# Patient Record
Sex: FEMALE | Race: WHITE | NOT HISPANIC OR LATINO
[De-identification: names, ages, dates, MRNs, and addresses within clinical notes are randomized per-mention and may not be internally consistent; named-entity substitution may affect disease eponyms.]

---

## 2018-12-01 ENCOUNTER — RX ONLY (OUTPATIENT)
Age: 43
Setting detail: RX ONLY
End: 2018-12-01

## 2019-10-30 ENCOUNTER — HOSPITAL ENCOUNTER (OUTPATIENT)
Dept: HOSPITAL 61 - SURGPAT | Age: 44
Discharge: HOME | End: 2019-10-30
Attending: OBSTETRICS & GYNECOLOGY
Payer: OTHER GOVERNMENT

## 2019-10-30 DIAGNOSIS — M25.78: ICD-10-CM

## 2019-10-30 DIAGNOSIS — E03.9: ICD-10-CM

## 2019-10-30 DIAGNOSIS — M47.814: ICD-10-CM

## 2019-10-30 DIAGNOSIS — Z01.818: Primary | ICD-10-CM

## 2019-10-30 DIAGNOSIS — I10: ICD-10-CM

## 2019-10-30 LAB
ALBUMIN SERPL-MCNC: 3.9 G/DL (ref 3.4–5)
ALBUMIN/GLOB SERPL: 1.1 {RATIO} (ref 1–1.7)
ALP SERPL-CCNC: 53 U/L (ref 46–116)
ALT SERPL-CCNC: 15 U/L (ref 14–59)
AMORPH SED URNS QL MICRO: PRESENT /HPF
ANION GAP SERPL CALC-SCNC: 10 MMOL/L (ref 6–14)
APTT PPP: YELLOW S
AST SERPL-CCNC: 14 U/L (ref 15–37)
BACTERIA #/AREA URNS HPF: 0 /HPF
BASOPHILS # BLD AUTO: 0.1 X10^3/UL (ref 0–0.2)
BASOPHILS NFR BLD: 1 % (ref 0–3)
BILIRUB SERPL-MCNC: 0.2 MG/DL (ref 0.2–1)
BILIRUB UR QL STRIP: NEGATIVE
BUN SERPL-MCNC: 13 MG/DL (ref 7–20)
BUN/CREAT SERPL: 13 (ref 6–20)
CALCIUM SERPL-MCNC: 8.8 MG/DL (ref 8.5–10.1)
CHLORIDE SERPL-SCNC: 107 MMOL/L (ref 98–107)
CO2 SERPL-SCNC: 25 MMOL/L (ref 21–32)
CREAT SERPL-MCNC: 1 MG/DL (ref 0.6–1)
EOSINOPHIL NFR BLD: 0.2 X10^3/UL (ref 0–0.7)
EOSINOPHIL NFR BLD: 3 % (ref 0–3)
ERYTHROCYTE [DISTWIDTH] IN BLOOD BY AUTOMATED COUNT: 14.7 % (ref 11.5–14.5)
FIBRINOGEN PPP-MCNC: CLEAR MG/DL
GFR SERPLBLD BASED ON 1.73 SQ M-ARVRAT: 59.7 ML/MIN
GLOBULIN SER-MCNC: 3.6 G/DL (ref 2.2–3.8)
GLUCOSE SERPL-MCNC: 78 MG/DL (ref 70–99)
HCT VFR BLD CALC: 41.2 % (ref 36–47)
HGB BLD-MCNC: 13.7 G/DL (ref 12–15.5)
LYMPHOCYTES # BLD: 1.6 X10^3/UL (ref 1–4.8)
LYMPHOCYTES NFR BLD AUTO: 27 % (ref 24–48)
MCH RBC QN AUTO: 29 PG (ref 25–35)
MCHC RBC AUTO-ENTMCNC: 33 G/DL (ref 31–37)
MCV RBC AUTO: 87 FL (ref 79–100)
MONO #: 0.5 X10^3/UL (ref 0–1.1)
MONOCYTES NFR BLD: 8 % (ref 0–9)
NEUT #: 3.7 X10^3/UL (ref 1.8–7.7)
NEUTROPHILS NFR BLD AUTO: 61 % (ref 31–73)
NITRITE UR QL STRIP: NEGATIVE
PH UR STRIP: 7.5 [PH]
PLATELET # BLD AUTO: 197 X10^3/UL (ref 140–400)
POTASSIUM SERPL-SCNC: 3.7 MMOL/L (ref 3.5–5.1)
PROT SERPL-MCNC: 7.5 G/DL (ref 6.4–8.2)
PROT UR STRIP-MCNC: NEGATIVE MG/DL
RBC # BLD AUTO: 4.72 X10^6/UL (ref 3.5–5.4)
RBC #/AREA URNS HPF: 0 /HPF (ref 0–2)
SODIUM SERPL-SCNC: 142 MMOL/L (ref 136–145)
UROBILINOGEN UR-MCNC: 0.2 MG/DL
WBC # BLD AUTO: 6 X10^3/UL (ref 4–11)
WBC #/AREA URNS HPF: 0 /HPF (ref 0–4)

## 2019-10-30 PROCEDURE — 36415 COLL VENOUS BLD VENIPUNCTURE: CPT

## 2019-10-30 PROCEDURE — 71046 X-RAY EXAM CHEST 2 VIEWS: CPT

## 2019-10-30 PROCEDURE — 81001 URINALYSIS AUTO W/SCOPE: CPT

## 2019-10-30 PROCEDURE — 93005 ELECTROCARDIOGRAM TRACING: CPT

## 2019-10-30 PROCEDURE — 85025 COMPLETE CBC W/AUTO DIFF WBC: CPT

## 2019-10-30 PROCEDURE — 80053 COMPREHEN METABOLIC PANEL: CPT

## 2019-10-30 NOTE — EKG
[facility]

              [address] [place] [address]

Test Date:    2019-10-30               Test Time:    15:31:58

Pat Name:     NYDIA FREED               Department:   

Patient ID:   [id number]           Room:          

Gender:       F                        Technician:   

:          [id number]               Requested By: EDA BRISENO

Order Number: [id number]           Reading MD:   Luc Pacheco

                                 Measurements

Intervals                              Axis          

Rate:         59                       P:            47

GA:           162                      QRS:          43

QRSD:         88                       T:            49

QT:           390                                    

QTc:          390                                    

                           Interpretive Statements

SINUS RHYTHM



Electronically Signed On 10- 16:12:54 CDT by Luc Pacheco

## 2019-10-30 NOTE — RAD
INDICATION: Preoperative evaluation for hysterectomy

 

COMPARISON: None.

 

FINDINGS:

 

2 view of chest obtained.

Degenerative changes the spine with osteophyte formation. No definite 

focal airspace consolidation or pulmonary edema. Cardiac silhouette 

unremarkable.

 

 

IMPRESSION:

 

*  No focal airspace consolidation or edema.

 

Electronically signed by: Gunnar Morales MD (10/30/2019 5:14 PM) [facility]

## 2019-11-01 NOTE — NUR
FAXED PRE - OP TEST REPORTS TO 'S OFFICE FOR REVIEW AT 0824 11/1/19 AND RECEIVED 
TRANSMITTAL CONFIRMATION.

## 2019-11-06 ENCOUNTER — HOSPITAL ENCOUNTER (OUTPATIENT)
Dept: HOSPITAL 61 - SURG | Age: 44
Setting detail: OBSERVATION
LOS: 1 days | Discharge: HOME | End: 2019-11-07
Attending: OBSTETRICS & GYNECOLOGY | Admitting: OBSTETRICS & GYNECOLOGY
Payer: OTHER GOVERNMENT

## 2019-11-06 VITALS — SYSTOLIC BLOOD PRESSURE: 109 MMHG | DIASTOLIC BLOOD PRESSURE: 70 MMHG

## 2019-11-06 VITALS — DIASTOLIC BLOOD PRESSURE: 74 MMHG | SYSTOLIC BLOOD PRESSURE: 121 MMHG

## 2019-11-06 VITALS — DIASTOLIC BLOOD PRESSURE: 75 MMHG | SYSTOLIC BLOOD PRESSURE: 118 MMHG

## 2019-11-06 VITALS — WEIGHT: 136.69 LBS | HEIGHT: 65 IN | BODY MASS INDEX: 22.77 KG/M2

## 2019-11-06 VITALS — SYSTOLIC BLOOD PRESSURE: 113 MMHG | DIASTOLIC BLOOD PRESSURE: 63 MMHG

## 2019-11-06 VITALS — SYSTOLIC BLOOD PRESSURE: 118 MMHG | DIASTOLIC BLOOD PRESSURE: 73 MMHG

## 2019-11-06 VITALS — SYSTOLIC BLOOD PRESSURE: 112 MMHG | DIASTOLIC BLOOD PRESSURE: 70 MMHG

## 2019-11-06 VITALS — DIASTOLIC BLOOD PRESSURE: 72 MMHG | SYSTOLIC BLOOD PRESSURE: 113 MMHG

## 2019-11-06 VITALS — SYSTOLIC BLOOD PRESSURE: 117 MMHG | DIASTOLIC BLOOD PRESSURE: 75 MMHG

## 2019-11-06 VITALS — DIASTOLIC BLOOD PRESSURE: 75 MMHG | SYSTOLIC BLOOD PRESSURE: 127 MMHG

## 2019-11-06 VITALS — DIASTOLIC BLOOD PRESSURE: 69 MMHG | SYSTOLIC BLOOD PRESSURE: 110 MMHG

## 2019-11-06 DIAGNOSIS — N92.0: ICD-10-CM

## 2019-11-06 DIAGNOSIS — D25.9: Primary | ICD-10-CM

## 2019-11-06 DIAGNOSIS — N94.10: ICD-10-CM

## 2019-11-06 DIAGNOSIS — N83.201: ICD-10-CM

## 2019-11-06 DIAGNOSIS — N39.3: ICD-10-CM

## 2019-11-06 DIAGNOSIS — I10: ICD-10-CM

## 2019-11-06 PROCEDURE — 81025 URINE PREGNANCY TEST: CPT

## 2019-11-06 PROCEDURE — 86850 RBC ANTIBODY SCREEN: CPT

## 2019-11-06 PROCEDURE — G0378 HOSPITAL OBSERVATION PER HR: HCPCS

## 2019-11-06 PROCEDURE — C1771 REP DEV, URINARY, W/SLING: HCPCS

## 2019-11-06 PROCEDURE — 86901 BLOOD TYPING SEROLOGIC RH(D): CPT

## 2019-11-06 PROCEDURE — G0379 DIRECT REFER HOSPITAL OBSERV: HCPCS

## 2019-11-06 PROCEDURE — 86900 BLOOD TYPING SEROLOGIC ABO: CPT

## 2019-11-06 PROCEDURE — 88307 TISSUE EXAM BY PATHOLOGIST: CPT

## 2019-11-06 PROCEDURE — 80048 BASIC METABOLIC PNL TOTAL CA: CPT

## 2019-11-06 PROCEDURE — A7015 AEROSOL MASK USED W NEBULIZE: HCPCS

## 2019-11-06 PROCEDURE — 36415 COLL VENOUS BLD VENIPUNCTURE: CPT

## 2019-11-06 PROCEDURE — 85014 HEMATOCRIT: CPT

## 2019-11-06 PROCEDURE — 57288 REPAIR BLADDER DEFECT: CPT

## 2019-11-06 PROCEDURE — 58552 LAPARO-VAG HYST INCL T/O: CPT

## 2019-11-06 RX ADMIN — OXYCODONE HYDROCHLORIDE AND ACETAMINOPHEN PRN TAB: 5; 325 TABLET ORAL at 14:10

## 2019-11-06 RX ADMIN — TOPIRAMATE SCH MG: 100 TABLET, FILM COATED ORAL at 21:00

## 2019-11-06 RX ADMIN — OXYCODONE HYDROCHLORIDE AND ACETAMINOPHEN PRN TAB: 5; 325 TABLET ORAL at 23:47

## 2019-11-06 RX ADMIN — LEVOTHYROXINE SODIUM SCH MCG: 88 TABLET ORAL at 09:00

## 2019-11-06 RX ADMIN — TOPIRAMATE SCH MG: 100 TABLET, FILM COATED ORAL at 17:57

## 2019-11-06 RX ADMIN — OXYCODONE HYDROCHLORIDE AND ACETAMINOPHEN PRN TAB: 5; 325 TABLET ORAL at 17:55

## 2019-11-06 NOTE — PDOC
BRIEF OPERATIVE NOTE


Date:  Nov 6, 2019


Pre-Op Diagnosis


uterine fibroids, right ovarian cyst, GSUI


Post-Op Diagnosis


same


Procedure Performed


LAVH/RSO/left salpingectomy/TVT abbrevo with cystoscopy


Surgeon


Dr. Paulina Boudreaux


Assistant


ARTEMIO Tom


Anesthesiologist


Dr. Ward


Anesthesia Type:  General


Blood Loss


20cc


IV Fluid


1200cc crystalloid


Urine Output


700cc clear via ballard


Specimens Obtained


cervix, uterus, bilateral tubes and right ovary


Findings


mildly enlarged RV uterus, cysts on right ovary, normal left tube and ovary, 

mild adhesions on right side near appendix (above where I was operating on so 

left alone)


Complications


none


Operative Note


[id number]











PAULINA BOUDREAUX MD              Nov 6, 2019 10:39

## 2019-11-06 NOTE — OP
DATE OF SURGERY:  11/06/2019



PREOPERATIVE DIAGNOSES: Uterine fibroids, right ovarian cyst and genuine stress

urinary incontinence.



POSTOPERATIVE DIAGNOSES:  Uterine fibroids, right ovarian cyst and genuine

stress urinary incontinence.



PROCEDURE:  Laparoscopic-assisted vaginal hysterectomy, right

salpingo-oophorectomy, left salpingectomy, a TVT-Abbrevo with cystoscopy.



SURGEON:  Eda Boudreaux M.D.



ASSISTANT:  ARTEMIO Arechiga.



ANESTHESIOLOGIST:  Dr. Ward.



ANESTHESIA:  General.



URINE OUTPUT:  700 mL clear via Rockwell catheter.



IV FLUIDS:  1200 mL of crystalloid.



BLOOD LOSS:  20 mL.



SPECIMENS:  Cervix, uterus, bilateral tubes, and right ovary.



FINDINGS:  A mildly enlarged retroverted uterus, cysts on the right ovary,

normal left tube and ovary, mild adhesions, but not in the pelvic area, up

higher on the right side near the appendix, which was above where I was

operating.  So, these were left alone.



COMPLICATIONS:  None.



DESCRIPTION OF PROCEDURE:  This patient was taken to the operating room where

general anesthesia was placed.  The patient was placed in a dorsal lithotomy

position in [facility].  The patient's abdomen and vagina were both prepped

and draped in the normal sterile fashion and a Rockwell catheter had been inserted

prior to my arrival.  Upon my arrival, a timeout was performed.  Once everyone

agreed on the patient, procedure, and she had received her preoperative

antibiotics, a bivalve speculum was placed in the patient's vagina.  A

single-tooth tenaculum was used to grasp the anterior lip of the cervix.  A 10

mL of 0.25% Marcaine with epinephrine was used to circumferentially inject

around the cervix for both hemodissection and hemostatic purposes later.  The

Valtchev uterine manipulator was placed through the endocervical os, locked on

the single tooth tenaculum and the bivalve speculum was then removed.  Top

gloves were discarded and changed.



Attention was then turned to the abdomen where a small supraumbilical skin

incision was made with the scalpel as she did have a mildly enlarged uterus. 

Curved Louisa was used to dissect through the subcuticular layer to the fascia. 

The 5 mm Visiport was used to directly enter the abdominal cavity.  Opening

patient pressure was 2-3 mmHg.  Carbon dioxide gas was used to then

appropriately insufflate the abdominal cavity to maintain a pressure of 15 mmHg.

 The patient was placed in Trendelenburg position.  Prior to placing that, I did

put 2-3 mL of 0.25% Marcaine with epinephrine of local in there.  Then, we

transilluminated the left side, found out where we were going to go, placed

another 3-4 mL there, made a small incision and placed the trocar under direct

visualization without difficulty.  A 4-5 mL of air was placed in this trocar

cuff balloon.  The camera was then moved to look at the umbilical port.  It was

clear, so 4-5 mL of air was placed in this as well to ensure it would not come

out.  The camera was moved back to the midline, transilluminating the right

side, finding an area clear on the inside.  Again, using the remainder of the 10

mL, another 3-4 mL of 0.25% Marcaine with epinephrine, the local solution to

inject, then make the small incision and placed the trocar under direct

visualization atraumatically.  A 4-5 mL of air was placed in this trocar balloon

on the cuff as well.  Once this was done, the patient was placed in

Trendelenburg position, and the left tube and ovary were examined and found to

be normal.  This is the one patient wished to keep.  So, we found the ureter

coursing low, but I stayed above the ovary below the tube and did a

salpingectomy with the LigaSure.  Then, I crossed the left uterine ovarian

pedicle and the left round ligament.  Then, with the cervix pushed up to the

cephalad to the head of the bed and pushing the uterus up with the vaginal

manipulator.  The bladder flap was grasped with the [place] and the monopolar

hook was used to go across it and create the bladder flap sharply and pulling it

down the bladder came down very nicely.  At this point, the right tube and ovary

were identified and elevated.  There were cysts on the right ovary as indicated

per the sonogram preoperatively.  This is the side the patient wanted out.  So,

the right tube and ovary were elevated, finding the ureter coursing low in the

pelvis following it down, watching it peristalse, staying high on the ovary well

above the level of the ureter, again crossing the infundibulopelvic ligament

right under the ovary, cauterizing and cutting with the LigaSure, removing the

right tube and ovary per patient request and going over and then getting the

right round ligament as well.  Once this was done, we went down and further met

that bladder flap anteriorly.  Once it was down, I got the uterine vessels on

the right side, cauterizing and cutting and then staying inside this pedicle,

working my way down through the cardinal and broad ligaments to the level of the

uterosacral.  Then, I went back over to the patient's left side and got the

uterine vessels as well and then staying inside this pedicle, going down through

the cardinal and broad ligament, staying vertical on the cervix, hugging the

cervix, going down to the level of the uterosacral as well.  The uterus was

completely free, completely blanched of all blood supply, all instruments were

removed.  There was no active bleeding at this point at all and attention was

turned vaginally.



The single tooth and Valtchev were removed.  A weighted speculum was placed in

the patient's vagina.  Thyroid Allie clamps were placed on the anterior and

posterior lips of the cervix respectively.  A scalpel was used to make a

circumferential incision in the cervix.  The cervix was elevated.  The posterior

cul-de-sac was sharply entered with curved Arias scissors and a #0 Vicryl stitch

was used to secure the posterior peritoneum to the vaginal cuff, tagging it with

a curved Louisa clamp, cutting and passing the needle off, removing the short

weighted vaginal speculum and replacing it with the long weighted Verónica

speculum in the posterior cul-de-sac.  At this point, the blunt end of the

suction tip was used to gently push up the anterior bladder peritoneum, so I

could create the bladder flap sharply.  Upon pushing it up, I found a metal

90-degree hook that looked initially like a staple that was removed.  Upon

removing the Thyroid Allie clamp, there was found to be missing one of its teeth

and it fitted exactly, so it was removed entirely and passed off and put

together and taken off the field and a new Thyroid Allie clamp was obtained. 

So, this was gently pushed up, and with the bladder flap was created sharply and

then bluntly pushed up with an open Ray-Alisia 4 x 4 and the anterior cul-de-sac

was digitally and bluntly entered.  The Ray-Alisia was removed and the curved

Sprague was placed in the anterior cul-de-sac.  At this point, curved Dona

clamps x 2 were placed on the patient's left uterosacral ligament where they

were doubly clamped with curved Heaneys, cut with Arias scissors and suture

ligated x 2 with 0 Vicryl.  The second one was taken through the vaginal cuff,

securing uterosacral ligament to the vaginal cuff, tagged with a straight Louisa

clamp and the needle was cut and passed off.  This was done exactly the same on

the patient's right side, double clamping the uterosacrals with curved Dona's,

cutting with Arias scissors, suture ligating x 2 with 0 Vicryl, taking the second

one through the vaginal cuff, securing uterosacral ligament to vaginal cuff,

tagging it with a straight Louisa clamp and cutting and passing the needle off. 

Once this was done, the right angle clamp was used to delineate the remaining

pedicle on the left side, the vaginal LigaSure was used to cauterize and cut the

remaining pedicle freeing up the entire left side.  This was done exactly the

same on the right.  Once using the right angle clamp to delineate the remaining

pedicle and the vaginal LigaSure max to cauterize and cut it.  Once this was

done, the cervix, uterus, right tube and ovary and left tube were delivered in

total and passed off for permanent pathology.  The long Allis was used to grasp

the anterior bladder peritoneum.  A sponge stick was used to examine the

pedicles.  There was some slight bleeding from the patient's right side that was

easily grasped and cauterized with the vaginal LigaSure.  Once this was done,

the pedicles were dry and reexamined.  The long weighted Verónica speculum was

removed and replaced with the short weighted vaginal speculum again.  All the

pedicles were examined again.  Once they were assured to be hemostatic, 2-0

Vicryl was taken through the anterior bladder peritoneum, left uterosacral

ligament, posterior peritoneum and right uterosacral ligament, thus closing the

peritoneum in a pursestring like fashion.  So, once this was done, the right and

left uterosacral tags were clipped.  A full length 2-0 Vicryl was used to close

the cuff in an anterior to posterior running locked fashion and tied to that

posterior cuff tag.  At this point, the vaginal cuff was closed and all sponge,

lap and needle counts were correct x 2 by OR personnel.  At this point, the

Rockwell bulb was deflated on the catheter and going approximately 1 cm below the

urethra.  An Allis clamp was placed here 35 mL of a dilute solution of 25 parts

local to 100 mL of injectable saline, so a 1 part local to 4 parts injectable

saline.  Dilute solution was used, 35 mL was injected centrally and out

laterally towards the obturator with injecting withdrawing making sure we were

not in any vascular space and doing this.  A small 1 cm vertical incision was

made below this, opening up the suburethral area and once this was done, Allis

clamps were placed on either side of that opening.  Metzenbaum scissors were

used to dissect out laterally staying superficial right under the vaginal mucosa

out to the obturator foramen on both sides.  Once this was done, the sling had

been opened.  The wing guide applicator was placed on the patient's right side. 

The sling was making sure it was all straight.  It was placed inside that. 

Prior to doing this, I did use a marking pen to brayden 2 cm up from the urethral

opening.  I made a line and then 2 cm out on the lateral groin fold out from

there as well for the expected sling location on both sides.  So, once this was

done, the wing guide applicator was placed on the patient's right side.  The

sling was placed in here and came out a fraction of brayden away from this on the

right side.  A small nick was made overlying skin over the white sheath over the

sling handheld  and it popped right through.  A curved Louisa was placed on

this and it was removed from the handheld device below, pulling it through,

making sure it was straight.  There was absolutely no buttonholing on the right

side.  Then, on the left side, the wing guide applicator was placed in, it was

placed through.  Initially I did notice an immediate buttonhole just a small

one, so it was pulled back out and placed back in with no buttonhole.  Came out

right at the expected opening again, making a small nick in the superficial

skin, popping it through, putting a Louisa on it to remove it from the handheld

 of the sling, pulling it through, making sure the sling was straight.  I

did not pull it taut at this point, it was just through.  Again, I checked

vaginally, no buttonholing on either side.  At this point, the catheter was

removed because again the Rockwell bulb had been deflated and at this point, the

cystoscopy was performed.  A 400-500 mL of fluid was placed in with the bladder

bubble being seen.  Both urethral openings were seen at the trigone and there

was no blood, no trauma, no tape in the bladder.  So, at this point, some of the

fluid was drained, but the catheter was left out and a 2-layer closure with 4-0

Monocryl was used in a SubQ and then 2-0 Vicryl was placed over this for this

2-layer.  The 2-0 Vicryl little piece of the stitch was placed over that

buttonholing on the left side as well with excellent results.  Once they were

through, I did the cystoscopy, made sure it was in place.  The plastic sheaths

were removed.  The Prolene was used to center it over the 6 mm dilator, these

were pulled taut and through, the center Prolene was clipped.  FloSeal was

placed over the sling itself on both sides and then the 2-layer closure with the

Monocryl and Vicryl were done in the midline.  A small Vicryl on the lateral

left side where the buttonhole was and then glue was used over the groin sides. 

This part completed the vaginal part and the sling and the cystoscopy.  So, all

gloves were discarded and changed.



A second look from above revealed complete hemostasis.  Tisseel was placed over

the cuff in the remaining pedicle.  The left ovary did remain per patient's

request.  It was all dry.  The pericolic gutters were clear.  The gas was taken

out from the trocar sites on the right and left lower quadrants in the center

one as well.  The right and left lower quadrant ports were removed under direct

visualization.  These were hemostatic.  The cuff still remained dry, so the gas

was released from the supraumbilical port.  Once it was out, it was removed, all

three port sites were closed with 4-0 nylon at the skin.  The patient was

awakened from anesthesia and brought to recovery room in stable condition.

 



______________________________

EDA BOUDREAUX MD



DR:  GWENDOLYN/vinny  JOB#:  [id number] / [id number]

DD:  11/06/2019 10:39  DT:  11/06/2019 12:51

## 2019-11-07 VITALS — SYSTOLIC BLOOD PRESSURE: 123 MMHG | DIASTOLIC BLOOD PRESSURE: 71 MMHG

## 2019-11-07 VITALS — DIASTOLIC BLOOD PRESSURE: 72 MMHG | SYSTOLIC BLOOD PRESSURE: 118 MMHG

## 2019-11-07 LAB
ANION GAP SERPL CALC-SCNC: 11 MMOL/L (ref 6–14)
BUN SERPL-MCNC: 7 MG/DL (ref 7–20)
CALCIUM SERPL-MCNC: 8.1 MG/DL (ref 8.5–10.1)
CHLORIDE SERPL-SCNC: 110 MMOL/L (ref 98–107)
CO2 SERPL-SCNC: 23 MMOL/L (ref 21–32)
CREAT SERPL-MCNC: 1 MG/DL (ref 0.6–1)
GFR SERPLBLD BASED ON 1.73 SQ M-ARVRAT: 59.7 ML/MIN
GLUCOSE SERPL-MCNC: 98 MG/DL (ref 70–99)
POTASSIUM SERPL-SCNC: 3.7 MMOL/L (ref 3.5–5.1)
SODIUM SERPL-SCNC: 144 MMOL/L (ref 136–145)

## 2019-11-07 RX ADMIN — LEVOTHYROXINE SODIUM SCH MCG: 88 TABLET ORAL at 05:13

## 2019-11-07 RX ADMIN — TOPIRAMATE SCH MG: 100 TABLET, FILM COATED ORAL at 08:01

## 2019-11-07 RX ADMIN — OXYCODONE HYDROCHLORIDE AND ACETAMINOPHEN PRN TAB: 5; 325 TABLET ORAL at 08:00

## 2019-11-07 NOTE — PDOC3
Discharge Summary


Visit Information


Date of Admission:  Nov 6, 2019


Date of Discharge:  Nov 7, 2019


Final Diagnosis


enlarged fibroid uterus, menorrhagia, GSUI





Brief Hospital Course


Allergies





                                    Allergies








Coded Allergies Type Severity Reaction Last Updated Verified


 


  naproxen Allergy Intermediate Hives 11/6/19 Yes


 


  adhesive tape Allergy Mild Rash 11/6/19 Yes








Vital Signs





Vital Signs








  Date Time  Temp Pulse Resp B/P (MAP) Pulse Ox O2 Delivery O2 Flow Rate FiO2


 


11/7/19 05:12 98.4 70 20 123/71 (88) 100 Room Air  





 98.4       


 


11/6/19 10:45       8 








Lab Results





Laboratory Tests








Test


 11/6/19


06:03 11/7/19


05:00 11/7/19


05:15


 


Bedside Urine HCG, Qualitative


 Hcg negative


(Negative) 


 





 


Hematocrit


 


 36.2 %


(36.0-47.0) 





 


Sodium Level


 


 


 144 mmol/L


(136-145)


 


Potassium Level


 


 


 3.7 mmol/L


(3.5-5.1)


 


Chloride Level


 


 


 110 mmol/L


()


 


Carbon Dioxide Level


 


 


 23 mmol/L


(21-32)


 


Anion Gap   11 (6-14) 


 


Blood Urea Nitrogen   7 mg/dL (7-20) 


 


Creatinine


 


 


 1.0 mg/dL


(0.6-1.0)


 


Estimated GFR


(Cockcroft-Gault) 


 


 59.7 





 


Glucose Level


 


 


 98 mg/dL


(70-99)


 


Calcium Level


 


 


 8.1 mg/dL


(8.5-10.1)








Laboratory Tests








Test


 11/7/19


05:00 11/7/19


05:15


 


Hematocrit


 36.2 %


(36.0-47.0) 





 


Sodium Level


 


 144 mmol/L


(136-145)


 


Potassium Level


 


 3.7 mmol/L


(3.5-5.1)


 


Chloride Level


 


 110 mmol/L


()


 


Carbon Dioxide Level


 


 23 mmol/L


(21-32)


 


Anion Gap  11 (6-14) 


 


Blood Urea Nitrogen  7 mg/dL (7-20) 


 


Creatinine


 


 1.0 mg/dL


(0.6-1.0)


 


Estimated GFR


(Cockcroft-Gault) 


 59.7 





 


Glucose Level


 


 98 mg/dL


(70-99)


 


Calcium Level


 


 8.1 mg/dL


(8.5-10.1)








Brief Hospital Course


Ms. Anderson  is a 44 old female who presented with enlarged fibroid uterus and 

urinary incontinence.  She underwent and LAVH/RSO/left salpingecotmy, TVT 

abbrevo and cysto yesterday without complications.  She has had an unremarkable 

postoperative course and will go home today.  AFVSS, voiding without catheter, 

tolerating regular diet





Assessment


Assessment


POD#1 s/p LAVH/RSO/left salpingectomy/TVT abbrevo with cystoscopy


d/c to home later today


NPV x 6 weeks


light/limited activity x 2 weeks (exlained to pt)


keep scheduled follow up with me in 1 week


already has narcotic pain meds filled at home


OK for OTC ibuprofen as needed


call or return sooner for any other questions or concerns not limited to but 

including pain unrelieved with pain meds, increased or unexplained vaginal blee

ding or T>100.4





Discharge Information


Condition at Discharge:  Improved


Follow Up:  Weeks


Disposition/Orders:  D/C to Home


Scheduled


Eletriptan Hbr (Relpax) 40 Mg Tablet, 40 MG PO BID for MIGRAINE, (Reported)


   Entered as Reported by: TEMO MCFARLAND on 10/30/19 1441


   Last Taken: Unknown Dose on 12/6/18      Last Action: HELD on 11/6/19 0738 by

EDA BRISENO


Galcanezumab-Gnlm (Emgality Syringe) 300 Mg/3 Ml Syringe, 300 MG SQ QMONTH for 

MIGRAINE, (Reported)


   Entered as Reported by: TEMO MCFARLAND on 10/30/19 1442


   Last Taken: Unknown Dose on 10/17/19      Last Action: HELD on 11/6/19 0738 

by EDA BRISENO


Levothyroxine Sodium (Levothyroxine Sodium) 88 Mcg Tablet, 1 TAB PO DAILY for 

THYROID, #30 Ref 5 (Reported)


   Entered as Reported by: RACHANA FELIX on 11/6/19 0641


   Last Taken: Unknown Dose on 11/6/19 0530     Last Action: Continued on 

11/6/19 0738 by EDA BRISENO


Lisinopril (Lisinopril) 10 Mg Tablet, 1 TAB PO DAILY for HTN, #30 Ref 5 

(Reported)


   Entered as Reported by: TEMO MCFARLAND on 10/30/19 1443


   Last Taken: Unknown Dose on 11/4/19      Last Action: Continued on 11/6/19 0738 by EDA BRISENO


Topiramate (Topamax) 100 Mg Tablet, 1 TAB PO BID for MIGRAINES for 30 Days, #60 

Ref 0 (Reported)


   Entered as Reported by: TEMO ALVERTOS on 10/30/19 1442


   Last Taken: Unknown Dose on 11/5/19      Last Action: Continued on 11/6/19 

0738 by EDA BRISENO





Patient Instructions


Patient Instructions


POD#1 s/p LAVH/RSO/left salpingectomy/TVT abbrevo with cystoscopy


d/c to home later today


NPV x 6 weeks


light/limited activity x 2 weeks (exlained to pt)


keep scheduled follow up with me in 1 week


already has narcotic pain meds filled at home


OK for OTC ibuprofen as needed


call or return sooner for any other questions or concerns not limited to but 

including pain unrelieved with pain meds, increased or unexplained vaginal 

bleeding or T>100.4











EDA BRISENO MD              Nov 7, 2019 08:30

## 2019-11-07 NOTE — PDOC
SURGICAL PROGRESS NOTE


Subjective


Doing well without complaints. Tolerating regular diet, ambulating well, voiding

without catheter.  wants to go home today.  Scant VB when wiping only


Vital Signs





Vital Signs








  Date Time  Temp Pulse Resp B/P (MAP) Pulse Ox O2 Delivery O2 Flow Rate FiO2


 


11/7/19 05:12 98.4 70 20 123/71 (88) 100 Room Air  





 98.4       


 


11/6/19 10:45       8 








I&O











Intake and Output 


 


 11/7/19





 07:00


 


Intake Total 2470 ml


 


Output Total 1520 ml


 


Balance 950 ml


 


 


 


Intake Oral 1220 ml


 


IV Total 1250 ml


 


Output Urine Total 1500 ml


 


Estimated Blood Loss 20 ml


 


# Voids 5








PATIENT HAS A PÉREZ:  No


General:  Alert, Oriented X3, Cooperative, No acute distress


HEENT:  Atraumatic


Heart:  Regular rate


Abdomen:  Soft, No tenderness (port sites c/d/i), Other


Extremities:  No clubbing, No cyanosis, No edema


Skin:  No rashes, No breakdown


Neuro:  Normal speech


Psych/Mental Status:  Mental status NL, Mood NL


Labs





Laboratory Tests








Test


 11/6/19


06:03 11/7/19


05:00 11/7/19


05:15


 


Bedside Urine HCG, Qualitative


 Hcg negative


(Negative) 


 





 


Hematocrit


 


 36.2 %


(36.0-47.0) 





 


Sodium Level


 


 


 144 mmol/L


(136-145)


 


Potassium Level


 


 


 3.7 mmol/L


(3.5-5.1)


 


Chloride Level


 


 


 110 mmol/L


()


 


Carbon Dioxide Level


 


 


 23 mmol/L


(21-32)


 


Anion Gap   11 (6-14) 


 


Blood Urea Nitrogen   7 mg/dL (7-20) 


 


Creatinine


 


 


 1.0 mg/dL


(0.6-1.0)


 


Estimated GFR


(Cockcroft-Gault) 


 


 59.7 





 


Glucose Level


 


 


 98 mg/dL


(70-99)


 


Calcium Level


 


 


 8.1 mg/dL


(8.5-10.1)








Laboratory Tests








Test


 11/7/19


05:00 11/7/19


05:15


 


Hematocrit


 36.2 %


(36.0-47.0) 





 


Sodium Level


 


 144 mmol/L


(136-145)


 


Potassium Level


 


 3.7 mmol/L


(3.5-5.1)


 


Chloride Level


 


 110 mmol/L


()


 


Carbon Dioxide Level


 


 23 mmol/L


(21-32)


 


Anion Gap  11 (6-14) 


 


Blood Urea Nitrogen  7 mg/dL (7-20) 


 


Creatinine


 


 1.0 mg/dL


(0.6-1.0)


 


Estimated GFR


(Cockcroft-Gault) 


 59.7 





 


Glucose Level


 


 98 mg/dL


(70-99)


 


Calcium Level


 


 8.1 mg/dL


(8.5-10.1)








I have reviewed the following


labs, vitals, nursing


Cardiovascular:  No pertinent hx


Pulmonary:  No pertinent hx


GI:  No pertinent hx


Heme/Onc:  No pertinent hx


Psych:  No pertinent hx


Assessment/Plan


POD#1 s/p LAVH/RSO/left salpingectomy/TVT abbrevo with cystoscopy


d/c to home later today


NPV x 6 weeks


light/limited activity x 2 weeks (exlained to pt)


keep scheduled follow up with me in 1 week


already has narcotic pain meds filled at home


OK for OTC ibuprofen as needed


call or return sooner for any other questions or concerns not limited to but 

including pain unrelieved with pain meds, increased or unexplained vaginal 

bleeding or T>100.4











EDA BRISENO MD              Nov 7, 2019 08:28

## 2019-11-08 NOTE — PATHOLOGY
[facility] Accession Number: [id number]

.                                                                01

Material submitted:                                        .

uterus - CERVIX, UTERUS, RIGHT FALLOPIAN TUBE AND OVARY, LEFT FALLOPIAN

TUBE. Modifiers: bilateral, right

.                                                                01

Clinical history:                                          .

Uterus fibroid

.                                                                02

**********************************************************************

Diagnosis:

"Cervix, uterus, right fallopian tube and ovary, left fallopian tube",

hysterectomy, bilateral salpingectomy and right oophorectomy:

- Cervix with mild chronic cervicitis.

- Endometrium with proliferative pattern.

- Myometrium with superficial adenomyosis and adenomyoma.

- Fallopian tubes, bilateral, with focal paratubal cysts.

- Ovary, right, with follicular cysts, resolving hemorrhagic corpus luteum

and corpora albicantia.

.

(CLW:mml; 11/07/2019)

[facility]  11/07/2019  1232 Local

**********************************************************************

.                                                                02

Electronically signed:                                     .

Perla Moulton MD, Pathologist

NPI- [id number]

.                                                                01

Gross description:                                         .

The specimen is received in formalin labeled "Laura Mower, cervix, uterus,

right fallopian tube and ovary, left fallopian tube".  Received is a 154

g, 9.6 x 6.9 x 4.8 cm uterus with attached cervix attached left fallopian

tube weighing 3 g, and attached right adnexa weighing 15 g.  The uterine

serosa is pink-tan and smooth in appearance.  The 1.5 cm cervical os is

surrounded by pink-tan, smooth to disrupted ectocervical mucosa.  The

uterus is oriented using the peritoneal reflection and the anterior

paracervical margin is inked black.  The uterus is opened laterally to

reveal a pale tan, slightly corrugated endocervical canal measuring 2.8 cm

in length.  The endometrial cavity is triangular measuring 4.8 cm in

length by 3.7 cm in width.  The endometrium is pale tan, glistening

appearance and measures 0.1 cm in thickness.  Serial sectioning reveals a

tan-pink, trabeculated myometrium measuring up to 2.8 cm in thickness

displaying a single possible cystic structure filled with blood-tinged

fluid measuring 0.5 cm.  Extensive sectioning reveals no grossly distinct

fibroids.

.

The left fimbriated fallopian tube measures 4.8 cm in length by up to 0.6

cm in diameter.  Sectioning reveals a patent lumen, and the fallopian tube

appears grossly unremarkable.

.

The right adnexa consists of a fimbriated fallopian tube measuring 4.5 cm

in length by up to 0.7 cm in diameter attached to a 2.5 x 2.5 x 1.8 cm

ovary.  Sectioning through the fallopian tube reveals a patent lumen and

the fallopian tube appears grossly unremarkable.  Sectioning through the

ovary reveals a unilocular cystic structure measuring 1.7 cm filled with

blood-tinged fluid.  The remaining cut surfaces display pale tan, normal

ovarian stroma.  The specimen is submitted representatively as follows:

.

A1     12:00 cervix

A2     6:00 cervix

A3     anterior endomyometrium

A4     posterior endomyometrium

A5     possible cystic structure

A6     left fallopian tube

A7-A8  right adnexa.

(CAA; 11/6/2019)

[facility]  11/06/2019  1614 Local

.                                                                02

Pathologist provided ICD-10:

N72, N80.0, N83.8, N83.01

.                                                                02

CPT                                                        .

940454

Specimen Comment: A courtesy copy of this report has been sent to [phone], [phone]

Specimen Comment: [id number]

Specimen Comment: Report sent to  / DR WISE

***Performed at:  01

   [facility]

   [address], [place]  [id number]

   MD Zheng Quiroz MD Phone:  [phone]

***Performed at:  02

   [facility]

   [address] [place], [place]  [id number]

   MD Vick Puente MD Phone:  [phone]

## 2020-01-01 ENCOUNTER — RX ONLY (OUTPATIENT)
Age: 45
Setting detail: RX ONLY
End: 2020-01-01

## 2023-10-20 ENCOUNTER — RX ONLY (OUTPATIENT)
Age: 48
Setting detail: RX ONLY
End: 2023-10-20

## 2023-10-31 ENCOUNTER — APPOINTMENT (RX ONLY)
Dept: URBAN - METROPOLITAN AREA CLINIC 341 | Facility: CLINIC | Age: 48
Setting detail: DERMATOLOGY
End: 2023-10-31

## 2023-10-31 DIAGNOSIS — D22 MELANOCYTIC NEVI: ICD-10-CM

## 2023-10-31 DIAGNOSIS — L30.9 DERMATITIS, UNSPECIFIED: ICD-10-CM | Status: INADEQUATELY CONTROLLED

## 2023-10-31 DIAGNOSIS — D18.0 HEMANGIOMA: ICD-10-CM

## 2023-10-31 DIAGNOSIS — Z85.820 PERSONAL HISTORY OF MALIGNANT MELANOMA OF SKIN: ICD-10-CM | Status: RESOLVED

## 2023-10-31 DIAGNOSIS — Z85.828 PERSONAL HISTORY OF OTHER MALIGNANT NEOPLASM OF SKIN: ICD-10-CM | Status: RESOLVED

## 2023-10-31 DIAGNOSIS — L82.1 OTHER SEBORRHEIC KERATOSIS: ICD-10-CM

## 2023-10-31 DIAGNOSIS — L81.4 OTHER MELANIN HYPERPIGMENTATION: ICD-10-CM

## 2023-10-31 PROBLEM — D22.5 MELANOCYTIC NEVI OF TRUNK: Status: ACTIVE | Noted: 2023-10-31

## 2023-10-31 PROBLEM — D18.01 HEMANGIOMA OF SKIN AND SUBCUTANEOUS TISSUE: Status: ACTIVE | Noted: 2023-10-31

## 2023-10-31 PROCEDURE — ? BIOPSY BY SHAVE METHOD

## 2023-10-31 PROCEDURE — ? TREATMENT REGIMEN

## 2023-10-31 PROCEDURE — 11102 TANGNTL BX SKIN SINGLE LES: CPT

## 2023-10-31 PROCEDURE — 99203 OFFICE O/P NEW LOW 30 MIN: CPT | Mod: 25

## 2023-10-31 PROCEDURE — ? COUNSELING

## 2023-10-31 PROCEDURE — ? ADDITIONAL NOTES

## 2023-10-31 ASSESSMENT — LOCATION DETAILED DESCRIPTION DERM
LOCATION DETAILED: SUBXIPHOID
LOCATION DETAILED: PERIUMBILICAL SKIN
LOCATION DETAILED: RIGHT SUPERIOR CENTRAL MALAR CHEEK
LOCATION DETAILED: EPIGASTRIC SKIN
LOCATION DETAILED: RIGHT SUPERIOR PARIETAL SCALP
LOCATION DETAILED: RIGHT LATERAL PROXIMAL UPPER ARM

## 2023-10-31 ASSESSMENT — LOCATION SIMPLE DESCRIPTION DERM
LOCATION SIMPLE: RIGHT UPPER ARM
LOCATION SIMPLE: ABDOMEN
LOCATION SIMPLE: RIGHT CHEEK
LOCATION SIMPLE: SCALP

## 2023-10-31 ASSESSMENT — LOCATION ZONE DERM
LOCATION ZONE: ARM
LOCATION ZONE: TRUNK
LOCATION ZONE: FACE
LOCATION ZONE: SCALP

## 2023-10-31 NOTE — PROCEDURE: ADDITIONAL NOTES
Additional Notes: Pt had MOHS surgery done in 2013 for basal cell cancer
Detail Level: Simple
Render Risk Assessment In Note?: no
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

## 2023-10-31 NOTE — PROCEDURE: BIOPSY BY SHAVE METHOD
Detail Level: Detailed
Depth Of Biopsy: dermis
Was A Bandage Applied: Yes
Size Of Lesion In Cm: 0
Biopsy Type: H and E
Biopsy Method: Dermablade
Anesthesia Type: 1% lidocaine with epinephrine
Anesthesia Volume In Cc: 0.5
Hemostasis: Aluminum Chloride
Wound Care: Petrolatum
Dressing: bandage
Destruction After The Procedure: No
Type Of Destruction Used: Curettage
Curettage Text: The wound bed was treated with curettage after the biopsy was performed.
Cryotherapy Text: The wound bed was treated with cryotherapy after the biopsy was performed.
Electrodesiccation Text: The wound bed was treated with electrodesiccation after the biopsy was performed.
Electrodesiccation And Curettage Text: The wound bed was treated with electrodesiccation and curettage after the biopsy was performed.
Silver Nitrate Text: The wound bed was treated with silver nitrate after the biopsy was performed.
Lab: 6
Lab Facility: [id number]
Consent: Verbal consent was obtained and risks were reviewed including but not limited to scarring, infection, bleeding, scabbing, incomplete removal, nerve damage and allergy to anesthesia.
Post-Care Instructions: I reviewed with the patient in detail post-care instructions. Patient is to keep the biopsy site dry overnight, and then apply bacitracin twice daily until healed. Patient may apply hydrogen peroxide soaks to remove any crusting.
Notification Instructions: Patient will be notified of biopsy results. However, patient instructed to call the office if not contacted within 2 weeks.
Billing Type: Third-Party Bill
Information: Selecting Yes will display possible errors in your note based on the variables you have selected. This validation is only offered as a suggestion for you. PLEASE NOTE THAT THE VALIDATION TEXT WILL BE REMOVED WHEN YOU FINALIZE YOUR NOTE. IF YOU WANT TO FAX A PRELIMINARY NOTE YOU WILL NEED TO TOGGLE THIS TO 'NO' IF YOU DO NOT WANT IT IN YOUR FAXED NOTE.